# Patient Record
Sex: MALE | Race: AMERICAN INDIAN OR ALASKA NATIVE | NOT HISPANIC OR LATINO | URBAN - METROPOLITAN AREA
[De-identification: names, ages, dates, MRNs, and addresses within clinical notes are randomized per-mention and may not be internally consistent; named-entity substitution may affect disease eponyms.]

---

## 2019-04-28 ENCOUNTER — EMERGENCY (EMERGENCY)
Facility: HOSPITAL | Age: 19
LOS: 1 days | Discharge: HOME | End: 2019-04-28
Attending: EMERGENCY MEDICINE | Admitting: EMERGENCY MEDICINE
Payer: COMMERCIAL

## 2019-04-28 VITALS
WEIGHT: 162.92 LBS | RESPIRATION RATE: 14 BRPM | HEART RATE: 51 BPM | SYSTOLIC BLOOD PRESSURE: 138 MMHG | OXYGEN SATURATION: 100 % | TEMPERATURE: 98 F | DIASTOLIC BLOOD PRESSURE: 86 MMHG

## 2019-04-28 DIAGNOSIS — H66.93 OTITIS MEDIA, UNSPECIFIED, BILATERAL: Chronic | ICD-10-CM

## 2019-04-28 PROCEDURE — 99283 EMERGENCY DEPT VISIT LOW MDM: CPT

## 2019-04-28 PROCEDURE — 73630 X-RAY EXAM OF FOOT: CPT | Mod: 26,RT

## 2019-04-28 RX ORDER — IBUPROFEN 200 MG
600 TABLET ORAL ONCE
Qty: 0 | Refills: 0 | Status: COMPLETED | OUTPATIENT
Start: 2019-04-28 | End: 2019-04-28

## 2019-04-28 RX ORDER — IBUPROFEN 200 MG
1 TABLET ORAL
Qty: 30 | Refills: 0 | OUTPATIENT
Start: 2019-04-28

## 2019-04-28 RX ADMIN — Medication 600 MILLIGRAM(S): at 09:31

## 2019-04-28 NOTE — ED PROVIDER NOTE - CLINICAL SUMMARY MEDICAL DECISION MAKING FREE TEXT BOX
19 y.o male with atraumatic right foot pain. Evaluate for stress facture vs. bone spur doubt gout as no warmth, redness, or swelling. Will obtain x-ray, discharge with podiatry follow up.

## 2019-04-28 NOTE — ED PROVIDER NOTE - PMH
ACL (anterior cruciate ligament) rupture    GERD (gastroesophageal reflux disease)    Lactose intolerance

## 2019-04-28 NOTE — ED PROVIDER NOTE - OBJECTIVE STATEMENT
19 year old male with atraumatic right foot pain since Monday. He states he woke up with discomfort on the ball. State same symptoms occurred previously in December where he saw an orthopedic doctor who said he had no abnormalities present. [place] otherwise no trauma, redness or swelling is present. He recently ran 2 and 1/2 miles on treadmill which his is usual exercise routine. 19 year old male with atraumatic right foot pain since Monday. He states he woke up with discomfort on the ball of his foot. State same symptoms occurred previously in December where he saw an orthopedic doctor who said he had no abnormalities present on xr.  [place] otherwise no trauma.  No redness or swelling is present. He recently ran 2 and 1/2 miles on treadmill which his is usual exercise routine.

## 2021-06-22 ENCOUNTER — APPOINTMENT (OUTPATIENT)
Dept: ORTHOPEDIC SURGERY | Facility: CLINIC | Age: 21
End: 2021-06-22
Payer: COMMERCIAL

## 2021-06-22 VITALS
WEIGHT: 168 LBS | SYSTOLIC BLOOD PRESSURE: 141 MMHG | DIASTOLIC BLOOD PRESSURE: 86 MMHG | TEMPERATURE: 97.6 F | HEART RATE: 54 BPM | BODY MASS INDEX: 27 KG/M2 | HEIGHT: 66 IN

## 2021-06-22 DIAGNOSIS — S43.402A UNSPECIFIED SPRAIN OF LEFT SHOULDER JOINT, INITIAL ENCOUNTER: ICD-10-CM

## 2021-06-22 PROCEDURE — 99203 OFFICE O/P NEW LOW 30 MIN: CPT

## 2021-06-22 PROCEDURE — 73030 X-RAY EXAM OF SHOULDER: CPT | Mod: LT

## 2021-06-22 PROCEDURE — 99072 ADDL SUPL MATRL&STAF TM PHE: CPT
